# Patient Record
Sex: MALE | Race: WHITE | Employment: OTHER | URBAN - METROPOLITAN AREA
[De-identification: names, ages, dates, MRNs, and addresses within clinical notes are randomized per-mention and may not be internally consistent; named-entity substitution may affect disease eponyms.]

---

## 2017-05-30 ENCOUNTER — OFFICE VISIT (OUTPATIENT)
Dept: CARDIOLOGY CLINIC | Age: 82
End: 2017-05-30

## 2017-05-30 VITALS
BODY MASS INDEX: 38.63 KG/M2 | DIASTOLIC BLOOD PRESSURE: 70 MMHG | HEIGHT: 74 IN | SYSTOLIC BLOOD PRESSURE: 128 MMHG | HEART RATE: 84 BPM | WEIGHT: 301 LBS | OXYGEN SATURATION: 96 %

## 2017-05-30 DIAGNOSIS — I48.0 PAROXYSMAL ATRIAL FIBRILLATION (HCC): ICD-10-CM

## 2017-05-30 DIAGNOSIS — I25.5 ISCHEMIC CARDIOMYOPATHY: ICD-10-CM

## 2017-05-30 DIAGNOSIS — R00.1 SINUS BRADYCARDIA: ICD-10-CM

## 2017-05-30 DIAGNOSIS — E78.5 DYSLIPIDEMIA: ICD-10-CM

## 2017-05-30 DIAGNOSIS — I50.42 CHRONIC COMBINED SYSTOLIC AND DIASTOLIC CONGESTIVE HEART FAILURE (HCC): ICD-10-CM

## 2017-05-30 DIAGNOSIS — I50.23 SYSTOLIC CHF, ACUTE ON CHRONIC (HCC): ICD-10-CM

## 2017-05-30 DIAGNOSIS — I25.10 CORONARY ARTERY DISEASE INVOLVING NATIVE CORONARY ARTERY OF NATIVE HEART WITHOUT ANGINA PECTORIS: Primary | ICD-10-CM

## 2017-05-30 DIAGNOSIS — I10 ESSENTIAL HYPERTENSION: ICD-10-CM

## 2017-05-30 NOTE — MR AVS SNAPSHOT
Visit Information Date & Time Provider Department Dept. Phone Encounter #  
 5/30/2017  9:00 AM Fany Jasso MD [facility] [address] [id number] Upcoming Health Maintenance Date Due DTaP/Tdap/Td series (1 - Tdap) 5/7/1957 ZOSTER VACCINE AGE 60> 5/7/1996 GLAUCOMA SCREENING Q2Y 5/7/2001 Pneumococcal 65+ High/Highest Risk (1 of 2 - PCV13) 5/7/2001 MEDICARE YEARLY EXAM 5/7/2001 INFLUENZA AGE 9 TO ADULT 8/1/2017 Allergies as of 5/30/2017  Review Complete On: 10/25/2016 By: Fany Jasso MD  
  
 Severity Noted Reaction Type Reactions Colchicine    Other (comments) GI upset Morphine    Nausea and Vomiting Opium    Nausea and Vomiting Pravachol [Pravastatin]    Rash Current Immunizations  Never Reviewed No immunizations on file. Not reviewed this visit You Were Diagnosed With   
  
 Codes Comments Systolic CHF, acute on chronic (HCC)    -  Primary ICD-10-CM: Y62.45 ICD-9-CM: 428.23, 428.0 Paroxysmal atrial fibrillation (HCC)     ICD-10-CM: I48.0 ICD-9-CM: 427.31 Chronic combined systolic and diastolic congestive heart failure (HCC)     ICD-10-CM: I50.42 
ICD-9-CM: 428.42, 428.0 Ischemic cardiomyopathy     ICD-10-CM: I25.5 ICD-9-CM: 414.8 Sinus bradycardia     ICD-10-CM: R00.1 ICD-9-CM: 427.89 Coronary artery disease involving native coronary artery of native heart without angina pectoris     ICD-10-CM: I25.10 ICD-9-CM: 414.01 Essential hypertension     ICD-10-CM: I10 
ICD-9-CM: 401.9 Dyslipidemia     ICD-10-CM: E78.5 ICD-9-CM: 272.4 Vitals BP Pulse Height(growth percentile) Weight(growth percentile) SpO2 BMI  
 128/70 84 6' 2\" (1.88 m) 301 lb (136.5 kg) 96% 38.65 kg/m2 Smoking Status Never Smoker Vitals History BMI and BSA Data Body Mass Index Body Surface Area  
 38.65 kg/m 2 2.67 m 2 Preferred Pharmacy Pharmacy Name Phone [address], [phone]  [facility] [phone] Your Updated Medication List  
  
   
This list is accurate as of: 5/30/17  9:42 AM.  Always use your most recent med list.  
  
  
  
  
 aspirin 81 mg tablet Take  by mouth. AVAPRO 300 mg tablet Generic drug:  irbesartan Take 300 mg by mouth nightly. CARDURA 2 mg tablet Generic drug:  doxazosin Take 2 mg by mouth daily. CENTRUM SILVER PO Take  by mouth. Cholecalciferol (Vitamin D3) 1,000 unit Chew Commonly known as:  VITAMIN D3 Take 1,000 Units by mouth daily. furosemide 40 mg tablet Commonly known as:  LASIX Take 1 Tab by mouth two (2) times a day. GLUCOTROL XL 10 mg CR tablet Generic drug:  glipiZIDE SR Take 10 mg by mouth two (2) times a day. INVOKANA 100 mg tablet Generic drug:  canagliflozin Take 100 mg by mouth Daily (before breakfast). metoprolol succinate 100 mg tablet Commonly known as:  TOPROL-XL  
TAKE 1 TABLET BY MOUTH EVERY DAY  
  
 PLAVIX 75 mg Tab Generic drug:  clopidogrel Take  by mouth daily. TRADJENTA 5 mg tablet Generic drug:  linagliptin Take 5 mg by mouth daily. ZETIA 10 mg tablet Generic drug:  ezetimibe Take  by mouth. ZOCOR 20 mg tablet Generic drug:  simvastatin Take  by mouth nightly. We Performed the Following AMB POC EKG ROUTINE W/ 12 LEADS, INTER & REP [97940 CPT(R)] Introducing [facility]! [facility] introduces [employer] patient portal. Now you can access parts of your medical record, email your doctor's office, and request medication refills online. 1. In your internet browser, go to [email or web address] 2. Click on the First Time User? Click Here link in the Sign In box. You will see the New Member Sign Up page. 3. Enter your [employer] Access Code exactly as it appears below.  You will not need to use this code after youve completed the sign-up process. If you do not sign up before the expiration date, you must request a new code. · [employer] Access Code: Westley Hardy Expires: 8/28/2017  8:39 AM 
 
4. Enter the last four digits of your Social Security Number (xxxx) and Date of Birth (mm/dd/yyyy) as indicated and click Submit. You will be taken to the next sign-up page. 5. Create a [employer] ID. This will be your [employer] login ID and cannot be changed, so think of one that is secure and easy to remember. 6. Create a [employer] password. You can change your password at any time. 7. Enter your Password Reset Question and Answer. This can be used at a later time if you forget your password. 8. Enter your e-mail address. You will receive e-mail notification when new information is available in [address] 9. Click Sign Up. You can now view and download portions of your medical record. 10. Click the Download Summary menu link to download a portable copy of your medical information. If you have questions, please visit the Frequently Asked Questions section of the [employer] website. Remember, [employer] is NOT to be used for urgent needs. For medical emergencies, dial 911. Now available from your iPhone and Android! Please provide this summary of care documentation to your next provider. Your primary care clinician is listed as Sushma Miller. If you have any questions after today's visit, please call [phone].

## 2017-05-30 NOTE — PROGRESS NOTES
HISTORY OF PRESENT ILLNESS  Fiorella Lopez is a 81 y.o. male. ASSESSMENT and PLAN    Mr. Veronia Ahumada has history of CAD. Back in 2005, he presented with severe shortness of breath without significant chest pains. He was diagnosed with anterior wall MI and subsequently underwent LAD stent. He also has residual RCA disease. He has ischemic cardiomyopathy with ejection fraction ranging from 35-45%. He continues to struggle with obesity. Repeat coronary angiography in November of 2014 showed 40% ostial LAD lesion with otherwise no significant stenosis. His ejection fraction was 40%. His echocardiogram in October of 2015 showed ejection fraction of 35-40%.  CAD:   Clinically stable. He denies any exertional chest pains.  Ischemic cardiomyopathy: His EF remains 35-40%. As noted previously, if his ejection fraction declines below 35%, he is a candidate for AICD.  BP:   Well-controlled.  HR:    Stable.  CHF:   Currently, there is no evidence of decompensated CHF.  Weight:   His weight is 301 pounds. This is his baseline.  Cholesterol:   Target LDL <70. He continues on Zocor 20 mg and Zetia 10 mg daily.  Anti-platelet:   Remains on ASA, and Plavix. I will plan on seeing him back in about 6-9 months. Thank you. Encounter Diagnoses   Name Primary?     Coronary artery disease involving native coronary artery of native heart without angina pectoris Yes    Systolic CHF, acute on chronic (HCC)     Paroxysmal atrial fibrillation (HCC)     Chronic combined systolic and diastolic congestive heart failure (HCC)     Ischemic cardiomyopathy     Sinus bradycardia     Essential hypertension     Dyslipidemia      current treatment plan is effective, no change in therapy  lab results and schedule of future lab studies reviewed with patient  reviewed diet, exercise and weight control  cardiovascular risk and specific lipid/LDL goals reviewed  use of aspirin to prevent MI and TIA's discussed      HPI Today, Mr. Coreen Marie has no complaints of chest pains, or exertional chest pains. He denies any worsening shortness of breath or dyspnea on exertion. He does have baseline dyspnea on exertion. His exercise capacity is limited by hip pains and joint pains. He uses a cane to ambulate. He denies any orthopnea or PND. He denies any palpitations or dizziness. He is in good spirits. Review of Systems   Respiratory: Negative for shortness of breath. Cardiovascular: Negative for chest pain, palpitations, orthopnea, claudication, leg swelling and PND. All other systems reviewed and are negative. Physical Exam   Constitutional: He is oriented to person, place, and time. He appears well-developed and well-nourished. HENT:   Head: Normocephalic. Eyes: Conjunctivae are normal.   Neck: Neck supple. No JVD present. Carotid bruit is not present. No thyromegaly present. Cardiovascular: Normal rate and regular rhythm. No murmur heard. Pulmonary/Chest: Breath sounds normal.   Abdominal: Bowel sounds are normal.   Musculoskeletal: He exhibits no edema. Neurological: He is alert and oriented to person, place, and time. Skin: Skin is warm and dry. Nursing note and vitals reviewed. PCP: Roberto Fry MD    Past Medical History:   Diagnosis Date    Blood in stool     Blood in stool     CAD (coronary artery disease)     residual RCA disease at the bifurcation which is treated medically; appears stable    Cardiac catheterization 11/22/2013    RCA patent and dominant; LM ok; LAD with ostial 40%; previous stent widely patent; Cx patent; LVEDP 10-12 mmHg; LVEF 40% with mild anterior hypokinesis; No sig MR or AS    Cardiac echocardiogram 10/15/2015    Tech difficult (Definity enhanced). Mild LVE. EF 35-40% (40-45% on study of 10/16/13). Mild diffuse hypk. Mod basal-mid anteroseptal, basal-mid inferior hypk. Severe apical hypk. Mild LVH. Indeterminate diastolic fx. Mild MR. Mild IVCE.   Findings suggest CAD in LAD.  Cardiac nuclear imaging test, abnormal 10/16/2013    Lg anterior anteroapical infarction w/o cash-infarct ischemia involving distal LAD. EF not obtained due to gating error. Neg EKG on pharm stress test.  High risk due to amount of myocardium involved.  Cardiac tilt table evaluation 04/07/2005    Positive tilt-table study, suggesting vasodepressor-type syncope. Patient developed mild bradycardia after a loss of consciousness.  Diabetes ([address]     non-insulin dependent    Dyslipidemia     on statins    Hyperlipidemia     on Zocor    Hypertension     controlled; high normal    Hypokinesis     mild global w/ ejection fraction of 45-50%; acute AMI 3/05 w/ stent LAD; residual rt coronary artery disease. Post MI EF of 30-35%; Recent LV gram 12/05 showed EF of 45%    Kidney stone     Microscopic hematuria     Myocardial infarction ([address] 3/05    anterior wall; LAD stenting    Obesity     mild to moderate    Skin cancer     Urinary tract infection, site not specified        Past Surgical History:   Procedure Laterality Date    HX TONSIL AND ADENOIDECTOMY      LEFT HEART PERCUTANEOUS  11/22/2013         UT CATH PLMT L HRT & ARTS W/NJX & ANGIO IMG S&I  11/22/2013            Current Outpatient Prescriptions   Medication Sig Dispense Refill    Cholecalciferol, Vitamin D3, (VITAMIN D3) 1,000 unit chew Take 1,000 Units by mouth daily. 30 Tab 0    furosemide (LASIX) 40 mg tablet Take 1 Tab by mouth two (2) times a day. 60 Tab 0    canagliflozin (INVOKANA) 100 mg tablet Take 100 mg by mouth Daily (before breakfast).  linagliptin (TRADJENTA) 5 mg tablet Take 5 mg by mouth daily.  metoprolol-XL (TOPROL-XL) 100 mg XL tablet TAKE 1 TABLET BY MOUTH EVERY DAY 30 Tab 11    glipiZIDE SR (GLUCOTROL XL) 10 mg CR tablet Take 10 mg by mouth two (2) times a day.  aspirin 81 mg tablet Take  by mouth.  simvastatin (ZOCOR) 20 mg tablet Take  by mouth nightly.       ezetimibe (ZETIA) 10 mg tablet Take  by mouth.  irbesartan (AVAPRO) 300 mg tablet Take 300 mg by mouth nightly.  MULTIVITAMINS W-MINERALS/LUT (CENTRUM SILVER PO) Take  by mouth.  doxazosin (CARDURA) 2 mg tablet Take 2 mg by mouth daily.  clopidogrel (PLAVIX) 75 mg tablet Take  by mouth daily.          The patient has a family history of    Social History   Substance Use Topics    Smoking status: Never Smoker    Smokeless tobacco: Never Used    Alcohol use No       Lab Results   Component Value Date/Time    Cholesterol, total 98 10/16/2015 12:36 AM    HDL Cholesterol 37 10/16/2015 12:36 AM    LDL, calculated 41.8 10/16/2015 12:36 AM    Triglyceride 96 10/16/2015 12:36 AM    CHOL/HDL Ratio 2.6 10/16/2015 12:36 AM        BP Readings from Last 3 Encounters:   05/30/17 128/70   10/25/16 126/62   04/12/16 124/62        Pulse Readings from Last 3 Encounters:   05/30/17 84   10/25/16 83   04/12/16 99       Wt Readings from Last 3 Encounters:   05/30/17 136.5 kg (301 lb)   10/25/16 136.5 kg (301 lb)   04/12/16 136.1 kg (300 lb)         EKG: unchanged from previous tracings, normal sinus rhythm, nonspecific ST and T waves changes, Q waves in V1 and V2.

## 2018-03-27 ENCOUNTER — OFFICE VISIT (OUTPATIENT)
Dept: CARDIOLOGY CLINIC | Age: 83
End: 2018-03-27

## 2018-03-27 VITALS
WEIGHT: 298 LBS | HEIGHT: 74 IN | HEART RATE: 89 BPM | DIASTOLIC BLOOD PRESSURE: 62 MMHG | BODY MASS INDEX: 38.24 KG/M2 | OXYGEN SATURATION: 97 % | SYSTOLIC BLOOD PRESSURE: 133 MMHG

## 2018-03-27 DIAGNOSIS — I10 ESSENTIAL HYPERTENSION: ICD-10-CM

## 2018-03-27 DIAGNOSIS — I25.5 ISCHEMIC CARDIOMYOPATHY: ICD-10-CM

## 2018-03-27 DIAGNOSIS — I25.10 CORONARY ARTERY DISEASE INVOLVING NATIVE CORONARY ARTERY OF NATIVE HEART WITHOUT ANGINA PECTORIS: Primary | ICD-10-CM

## 2018-03-27 DIAGNOSIS — E78.5 DYSLIPIDEMIA: ICD-10-CM

## 2018-03-27 DIAGNOSIS — R00.1 SINUS BRADYCARDIA: ICD-10-CM

## 2018-03-27 PROBLEM — E66.01 SEVERE OBESITY (BMI 35.0-39.9) WITH COMORBIDITY (HCC): Status: ACTIVE | Noted: 2018-03-27

## 2018-03-27 NOTE — MR AVS SNAPSHOT
[address] [facility] [address] [address] [address] [phone] Patient: Sidney Guevara. MRN: [id number] ZULEMA:[birth year] Visit Information Date & Time Provider Department Dept. Phone Encounter #  
 3/27/2018  8:20 AM Sage Herman MD [facility] [address] [id number] Upcoming Health Maintenance Date Due DTaP/Tdap/Td series (1 - Tdap) 5/7/1956 ZOSTER VACCINE AGE 60> 3/7/1996 GLAUCOMA SCREENING Q2Y 5/7/2001 Pneumococcal 65+ High/Highest Risk (1 of 2 - PCV13) 5/7/2001 Influenza Age 5 to Adult 8/1/2017 MEDICARE YEARLY EXAM 3/14/2018 Allergies as of 3/27/2018  Review Complete On: 5/30/2017 By: Sage Herman MD  
  
 Severity Noted Reaction Type Reactions Colchicine    Other (comments) GI upset Morphine    Nausea and Vomiting Opium    Nausea and Vomiting Pravachol [Pravastatin]    Rash Current Immunizations  Never Reviewed No immunizations on file. Not reviewed this visit You Were Diagnosed With   
  
 Codes Comments Coronary artery disease involving native coronary artery of native heart without angina pectoris    -  Primary ICD-10-CM: I25.10 ICD-9-CM: 414.01 Vitals BP Pulse Height(growth percentile) Weight(growth percentile) SpO2 BMI  
 133/62 89 6' 2\" (1.88 m) 298 lb (135.2 kg) 97% 38.26 kg/m2 Smoking Status Never Smoker Vitals History BMI and BSA Data Body Mass Index Body Surface Area  
 38.26 kg/m 2 2.66 m 2 Preferred Pharmacy Pharmacy Name Phone [address], [address] [place] [phone] [facility] [phone] Your Updated Medication List  
  
   
This list is accurate as of 3/27/18  9:11 AM.  Always use your most recent med list.  
  
  
  
  
 aspirin 81 mg tablet Take  by mouth. AVAPRO 300 mg tablet Generic drug:  irbesartan Take 300 mg by mouth nightly. CARDURA 2 mg tablet Generic drug:  doxazosin Take 2 mg by mouth daily. CENTRUM SILVER PO Take  by mouth. Cholecalciferol (Vitamin D3) 1,000 unit Chew Commonly known as:  VITAMIN D3 Take 1,000 Units by mouth daily. furosemide 40 mg tablet Commonly known as:  LASIX Take 1 Tab by mouth two (2) times a day. GLUCOTROL XL 10 mg CR tablet Generic drug:  glipiZIDE SR Take 10 mg by mouth two (2) times a day. INVOKANA 100 mg tablet Generic drug:  canagliflozin Take 100 mg by mouth Daily (before breakfast). metoprolol succinate 100 mg tablet Commonly known as:  TOPROL-XL  
TAKE 1 TABLET BY MOUTH EVERY DAY  
  
 PLAVIX 75 mg Tab Generic drug:  clopidogrel Take  by mouth daily. TRADJENTA 5 mg tablet Generic drug:  linagliptin Take 5 mg by mouth daily. ZETIA 10 mg tablet Generic drug:  ezetimibe Take  by mouth. ZOCOR 20 mg tablet Generic drug:  simvastatin Take  by mouth nightly. We Performed the Following AMB POC EKG ROUTINE W/ 12 LEADS, INTER & REP [42781 CPT(R)] Introducing [facility]! Teresa Bryson introduces [employer] patient portal. Now you can access parts of your medical record, email your doctor's office, and request medication refills online. 1. In your internet browser, go to [email or web address] 2. Click on the First Time User? Click Here link in the Sign In box. You will see the New Member Sign Up page. 3. Enter your [employer] Access Code exactly as it appears below. You will not need to use this code after youve completed the sign-up process. If you do not sign up before the expiration date, you must request a new code. · [employer] Access Code: [email or web address] Expires: 6/25/2018  8:02 AM 
 
4. Enter the last four digits of your Social Security Number (xxxx) and Date of Birth (mm/dd/yyyy) as indicated and click Submit.  You will be taken to the next sign-up page. 5. Create a [employer] ID. This will be your [employer] login ID and cannot be changed, so think of one that is secure and easy to remember. 6. Create a [employer] password. You can change your password at any time. 7. Enter your Password Reset Question and Answer. This can be used at a later time if you forget your password. 8. Enter your e-mail address. You will receive e-mail notification when new information is available in [address] 9. Click Sign Up. You can now view and download portions of your medical record. 10. Click the Download Summary menu link to download a portable copy of your medical information. If you have questions, please visit the Frequently Asked Questions section of the [employer] website. Remember, [employer] is NOT to be used for urgent needs. For medical emergencies, dial 911. Now available from your iPhone and Android! Please provide this summary of care documentation to your next provider. Your primary care clinician is listed as Jennie Jacobs. If you have any questions after today's visit, please call [phone].

## 2018-03-27 NOTE — PROGRESS NOTES
HISTORY OF PRESENT ILLNESS  Dede Summers. is a 81 y.o. male. ASSESSMENT and PLAN    Mr. Dmitry Monsalve has history of CAD. Back in 2005, he presented with severe shortness of breath without significant chest pains. He was diagnosed with anterior wall MI and subsequently underwent LAD stent. He also has residual RCA disease. He has ischemic cardiomyopathy with ejection fraction ranging from 35-45%. He continues to struggle with obesity. Repeat coronary angiography in November of 2014 showed 40% ostial LAD lesion with otherwise no significant stenosis. His ejection fraction was 40%. His echocardiogram in October of 2015 showed ejection fraction of 35-40%.  CAD:   Clinically stable.  BP:   Well-controlled.  HR:    Stable.  CHF:   There is no evidence of decompensated CHF noted. Does have history of ischemic cardiomyopathy with ejection fraction of 35-47. His last echocardiogram was in 2015.  Weight:   His weight today is 298 pounds. His baseline weight is 300 pounds. Again, further encouragement was given for weight control.  Cholesterol:   Target LDL <70. Remains on Zocor 20 and Zetia 10.  Anti-platelet:   Remains on ASA, and Plavix. I will see him back in about 6-9 months. Thank you. Encounter Diagnoses   Name Primary?     Coronary artery disease involving native coronary artery of native heart without angina pectoris Yes    Class 2 obesity due to excess calories with serious comorbidity and body mass index (BMI) of 38.0 to 38.9 in adult     Sinus bradycardia     Ischemic cardiomyopathy     Dyslipidemia     Essential hypertension      current treatment plan is effective, no change in therapy  lab results and schedule of future lab studies reviewed with patient  reviewed diet, exercise and weight control  cardiovascular risk and specific lipid/LDL goals reviewed  use of aspirin to prevent MI and TIA's discussed      HPI  Today, Mr. Reginold Sever has no complaints of chest pains, increased shortness of breath or decreased exercise capacity. 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 He denies any changes in his exercise stamina. He denies any orthopnea or PND. He denies any palpitations or dizziness. Review of Systems   Respiratory: Negative for shortness of breath. Cardiovascular: Negative for chest pain, palpitations, orthopnea, claudication, leg swelling and PND. All other systems reviewed and are negative. Physical Exam   Constitutional: He is oriented to person, place, and time. He appears well-developed and well-nourished. HENT:   Head: Normocephalic. Eyes: Conjunctivae are normal.   Neck: Neck supple. No JVD present. Carotid bruit is not present. No thyromegaly present. Cardiovascular: Normal rate and regular rhythm. No murmur heard. Pulmonary/Chest: Breath sounds normal.   Abdominal: Bowel sounds are normal.   Musculoskeletal: He exhibits no edema. Neurological: He is alert and oriented to person, place, and time. Skin: Skin is warm and dry. Nursing note and vitals reviewed. PCP: Hoda Agosto MD    Past Medical History:   Diagnosis Date    Blood in stool     Blood in stool     CAD (coronary artery disease)     residual RCA disease at the bifurcation which is treated medically; appears stable    Cardiac catheterization 11/22/2013    RCA patent and dominant; LM ok; LAD with ostial 40%; previous stent widely patent; Cx patent; LVEDP 10-12 mmHg; LVEF 40% with mild anterior hypokinesis; No sig MR or AS    Cardiac echocardiogram 10/15/2015    Tech difficult (Definity enhanced). Mild LVE. EF 35-40% (40-45% on study of 10/16/13). Mild diffuse hypk. Mod basal-mid anteroseptal, basal-mid inferior hypk. Severe apical hypk. Mild LVH. Indeterminate diastolic fx. Mild MR. Mild IVCE. Findings suggest CAD in LAD.     Cardiac nuclear imaging test, abnormal 10/16/2013    Lg anterior anteroapical infarction w/o cash-infarct ischemia involving distal LAD. EF not obtained due to gating error. Neg EKG on pharm stress test.  High risk due to amount of myocardium involved.  Cardiac tilt table evaluation 04/07/2005    Positive tilt-table study, suggesting vasodepressor-type syncope. Patient developed mild bradycardia after a loss of consciousness.  Diabetes ([address]     non-insulin dependent    Dyslipidemia     on statins    Hyperlipidemia     on Zocor    Hypertension     controlled; high normal    Hypokinesis     mild global w/ ejection fraction of 45-50%; acute AMI 3/05 w/ stent LAD; residual rt coronary artery disease. Post MI EF of 30-35%; Recent LV gram 12/05 showed EF of 45%    Kidney stone     Microscopic hematuria     Myocardial infarction 3/05    anterior wall; LAD stenting    Obesity     mild to moderate    Skin cancer     Urinary tract infection, site not specified        Past Surgical History:   Procedure Laterality Date    HX TONSIL AND ADENOIDECTOMY      LEFT HEART PERCUTANEOUS  11/22/2013         DE CATH PLMT L HRT & ARTS W/NJX & ANGIO IMG S&I  11/22/2013            Current Outpatient Prescriptions   Medication Sig Dispense Refill    Cholecalciferol, Vitamin D3, (VITAMIN D3) 1,000 unit chew Take 1,000 Units by mouth daily. 30 Tab 0    furosemide (LASIX) 40 mg tablet Take 1 Tab by mouth two (2) times a day. 60 Tab 0    canagliflozin (INVOKANA) 100 mg tablet Take 100 mg by mouth Daily (before breakfast).  linagliptin (TRADJENTA) 5 mg tablet Take 5 mg by mouth daily.  metoprolol-XL (TOPROL-XL) 100 mg XL tablet TAKE 1 TABLET BY MOUTH EVERY DAY 30 Tab 11    glipiZIDE SR (GLUCOTROL XL) 10 mg CR tablet Take 10 mg by mouth two (2) times a day.  aspirin 81 mg tablet Take  by mouth.  simvastatin (ZOCOR) 20 mg tablet Take  by mouth nightly.  ezetimibe (ZETIA) 10 mg tablet Take  by mouth.  irbesartan (AVAPRO) 300 mg tablet Take 300 mg by mouth nightly.       MULTIVITAMINS W-MINERALS/LUT (CENTRUM SILVER PO) Take  by mouth.  doxazosin (CARDURA) 2 mg tablet Take 2 mg by mouth daily.  clopidogrel (PLAVIX) 75 mg tablet Take  by mouth daily.          The patient has a family history of    Social History   Substance Use Topics    Smoking status: Never Smoker    Smokeless tobacco: Never Used    Alcohol use No       Lab Results   Component Value Date/Time    Cholesterol, total 98 10/16/2015 12:36 AM    HDL Cholesterol 37 (L) 10/16/2015 12:36 AM    LDL, calculated 41.8 10/16/2015 12:36 AM    Triglyceride 96 10/16/2015 12:36 AM    CHOL/HDL Ratio 2.6 10/16/2015 12:36 AM        BP Readings from Last 3 Encounters:   03/27/18 133/62   05/30/17 128/70   10/25/16 126/62        Pulse Readings from Last 3 Encounters:   03/27/18 89   05/30/17 84   10/25/16 83       Wt Readings from Last 3 Encounters:   03/27/18 135.2 kg (298 lb)   05/30/17 136.5 kg (301 lb)   10/25/16 136.5 kg (301 lb)         EKG: unchanged from previous tracings, normal sinus rhythm, Q waves in V1 and V2.

## 2018-03-27 NOTE — PROGRESS NOTES
1. Have you been to the ER, urgent care clinic since your last visit? Hospitalized since your last visit?no    2. Have you seen or consulted any other health care providers outside of the [address] [place] [address] since your last visit? Include any pap smears or colon screening.  no

## 2018-10-09 ENCOUNTER — OFFICE VISIT (OUTPATIENT)
Dept: CARDIOLOGY CLINIC | Age: 83
End: 2018-10-09

## 2018-10-09 VITALS
BODY MASS INDEX: 38.5 KG/M2 | SYSTOLIC BLOOD PRESSURE: 114 MMHG | HEART RATE: 110 BPM | OXYGEN SATURATION: 97 % | WEIGHT: 300 LBS | HEIGHT: 74 IN | DIASTOLIC BLOOD PRESSURE: 56 MMHG

## 2018-10-09 DIAGNOSIS — I48.0 PAROXYSMAL ATRIAL FIBRILLATION (HCC): ICD-10-CM

## 2018-10-09 DIAGNOSIS — I25.5 ISCHEMIC CARDIOMYOPATHY: ICD-10-CM

## 2018-10-09 DIAGNOSIS — I50.23 SYSTOLIC CHF, ACUTE ON CHRONIC (HCC): ICD-10-CM

## 2018-10-09 DIAGNOSIS — I25.10 CORONARY ARTERY DISEASE INVOLVING NATIVE CORONARY ARTERY OF NATIVE HEART WITHOUT ANGINA PECTORIS: Primary | ICD-10-CM

## 2018-10-09 NOTE — PROGRESS NOTES
HISTORY OF PRESENT ILLNESS Stacy Greenwood is a 81 y.o. male. ASSESSMENT and PLAN Mr. Aspen Goncalves has history of CAD. Back in 2005, he presented with severe shortness of breath without significant chest pains. He was diagnosed with anterior wall MI and subsequently underwent LAD stent. He also has residual RCA disease. He has ischemic cardiomyopathy with ejection fraction ranging from 35-45%. He continues to struggle with obesity. Repeat coronary angiography in November of 2014 showed 40% ostial LAD lesion with otherwise no significant stenosis. His ejection fraction was 40%. His echocardiogram in October of 2015 showed ejection fraction of 35-40%. ? CAD:   Clinically stable. 
? BP:   Well-controlled. ? HR:    He is tachycardic today. I discussed with him at length about possibly changing his Toprol-XL dose versus changing to carvedilol with his diminished LV function of 35-40%. After the lengthy discussion, decision was made to continue his current regimen. When I rechecked his heart rate at the end of the visit, his heart rate was down to 90 bpm.  This is likely from the fact that he had time to relax after his recent ambulation. ? CHF:   Currently, there is no evidence of decompensated CHF noted. ? Weight:   His weight today is 300 pounds. It is at baseline. ? Cholesterol:   Target LDL <70. Zocor 20, Zetia 10. 
? Anti-platelet:   Remains on ASA, and Plavix. I will see him back in about 6-9 months. Thank you. Encounter Diagnoses Name Primary?  Coronary artery disease involving native coronary artery of native heart without angina pectoris Yes  Systolic CHF, acute on chronic (HCC)  Paroxysmal atrial fibrillation (HCC)  Ischemic cardiomyopathy   
 
current treatment plan is effective, no change in therapy 
lab results and schedule of future lab studies reviewed with patient 
reviewed diet, exercise and weight control cardiovascular risk and specific lipid/LDL goals reviewed 
use of aspirin to prevent MI and TIA's discussed HPI Today, Mr. Wendy Pratt has no complaints of chest pains, increased shortness of breath or decreased exercise capacity. His exercise capacity is limited by his hip pains and knee pains. He uses a cane. He has not noted significant changes. He denies any orthopnea or PND. He denies any palpitations or dizziness. Unfortunately, he has not been able lose significant weight. Review of Systems Respiratory: Negative for shortness of breath. Cardiovascular: Negative for chest pain, palpitations, orthopnea, claudication, leg swelling and PND. Musculoskeletal: Positive for back pain and joint pain. All other systems reviewed and are negative. Physical Exam  
Constitutional: He is oriented to person, place, and time. He appears well-developed and well-nourished. HENT:  
Head: Normocephalic. Eyes: Conjunctivae are normal.  
Neck: Neck supple. No JVD present. Carotid bruit is not present. No thyromegaly present. Cardiovascular: Normal rate and regular rhythm. Pulmonary/Chest: Breath sounds normal.  
Abdominal: Bowel sounds are normal.  
Musculoskeletal: He exhibits no edema. Neurological: He is alert and oriented to person, place, and time. Skin: Skin is warm and dry. Nursing note and vitals reviewed. PCP: Thedford Denver, MD 
 
Past Medical History:  
Diagnosis Date  Blood in stool  Blood in stool  CAD (coronary artery disease)   
 residual RCA disease at the bifurcation which is treated medically; appears stable  Cardiac catheterization 11/22/2013 RCA patent and dominant; LM ok; LAD with ostial 40%; previous stent widely patent; Cx patent; LVEDP 10-12 mmHg; LVEF 40% with mild anterior hypokinesis; No sig MR or AS  Cardiac echocardiogram 10/15/2015 Tech difficult (Definity enhanced). Mild LVE.   EF 35-40% (40-45% on study of 10/16/13). Mild diffuse hypk. Mod basal-mid anteroseptal, basal-mid inferior hypk. Severe apical hypk. Mild LVH. Indeterminate diastolic fx. Mild MR. Mild IVCE. Findings suggest CAD in LAD.  Cardiac nuclear imaging test, abnormal 10/16/2013 Lg anterior anteroapical infarction w/o cash-infarct ischemia involving distal LAD. EF not obtained due to gating error. Neg EKG on pharm stress test.  High risk due to amount of myocardium involved.  Cardiac tilt table evaluation 04/07/2005 Positive tilt-table study, suggesting vasodepressor-type syncope. Patient developed mild bradycardia after a loss of consciousness.  Diabetes ([address]   
 non-insulin dependent  Dyslipidemia   
 on statins  Hyperlipidemia   
 on Zocor  Hypertension   
 controlled; high normal  
 Hypokinesis   
 mild global w/ ejection fraction of 45-50%; acute AMI 3/05 w/ stent LAD; residual rt coronary artery disease. Post MI EF of 30-35%; Recent LV gram 12/05 showed EF of 45%  Kidney stone  Microscopic hematuria  Myocardial infarction ([address] 3/05  
 anterior wall; LAD stenting  Obesity   
 mild to moderate  Skin cancer  Urinary tract infection, site not specified Past Surgical History:  
Procedure Laterality Date  HX TONSIL AND ADENOIDECTOMY  LEFT HEART PERCUTANEOUS  11/22/2013  [place] CATH PLMT L HRT & ARTS W/NJX & ANGIO IMG S&I  11/22/2013 Current Outpatient Prescriptions Medication Sig Dispense Refill  Cholecalciferol, Vitamin D3, (VITAMIN D3) 1,000 unit chew Take 1,000 Units by mouth daily. 30 Tab 0  
 furosemide (LASIX) 40 mg tablet Take 1 Tab by mouth two (2) times a day. 60 Tab 0  
 canagliflozin (INVOKANA) 100 mg tablet Take 100 mg by mouth Daily (before breakfast).  linagliptin (TRADJENTA) 5 mg tablet Take 5 mg by mouth daily.     
 metoprolol-XL (TOPROL-XL) 100 mg XL tablet TAKE 1 TABLET BY MOUTH EVERY DAY 30 Tab 11  
  glipiZIDE SR (GLUCOTROL XL) 10 mg CR tablet Take 10 mg by mouth two (2) times a day.  aspirin 81 mg tablet Take  by mouth.  simvastatin (ZOCOR) 20 mg tablet Take  by mouth nightly.  ezetimibe (ZETIA) 10 mg tablet Take  by mouth.  irbesartan (AVAPRO) 300 mg tablet Take 300 mg by mouth nightly.  MULTIVITAMINS W-MINERALS/LUT (CENTRUM SILVER PO) Take  by mouth.  doxazosin (CARDURA) 2 mg tablet Take 2 mg by mouth daily.  clopidogrel (PLAVIX) 75 mg tablet Take  by mouth daily. The patient has a family history of 
 
Social History Substance Use Topics  Smoking status: Never Smoker  Smokeless tobacco: Never Used  Alcohol use No  
 
 
Lab Results Component Value Date/Time Cholesterol, total 98 10/16/2015 12:36 AM  
 HDL Cholesterol 37 (L) 10/16/2015 12:36 AM  
 LDL, calculated 41.8 10/16/2015 12:36 AM  
 Triglyceride 96 10/16/2015 12:36 AM  
 CHOL/HDL Ratio 2.6 10/16/2015 12:36 AM  
  
 
BP Readings from Last 3 Encounters:  
10/09/18 114/56  
03/27/18 133/62  
05/30/17 128/70 Pulse Readings from Last 3 Encounters:  
10/09/18 (!) 110  
03/27/18 89  
05/30/17 84 Wt Readings from Last 3 Encounters:  
10/09/18 136.1 kg (300 lb)  
03/27/18 135.2 kg (298 lb) 05/30/17 136.5 kg (301 lb) EKG: nonspecific ST and T waves changes, sinus tachycardia, 1st degree AV block, decreased anterior voltage with late transition.

## 2018-10-09 NOTE — MR AVS SNAPSHOT
[address] [place] [id number] [phone] Patient: Zulma Graff MRN: [id number] Visit Information Date & Time Provider Department Dept. Phone Encounter #  
 10/9/2018  8:20 AM Julian Hurley MD [facility] [id number] Upcoming Health Maintenance Date Due DTaP/Tdap/Td series (1 - Tdap) 5/7/1956 Shingrix Vaccine Age 50> (1 of 2) 5/7/1986 GLAUCOMA SCREENING Q2Y 5/7/2001 Pneumococcal 65+ High/Highest Risk (1 of 2 - PCV13) 5/7/2001 MEDICARE YEARLY EXAM 3/14/2018 Influenza Age 5 to Adult 8/1/2018 Allergies as of 10/9/2018  Review Complete On: 3/27/2018 By: Julian Hurley MD  
  
 Severity Noted Reaction Type Reactions Colchicine    Other (comments) GI upset Morphine    Nausea and Vomiting Opium    Nausea and Vomiting Pravachol [Pravastatin]    Rash Current Immunizations  Never Reviewed No immunizations on file. Not reviewed this visit You Were Diagnosed With   
  
 Codes Comments Systolic CHF, acute on chronic (HCC)    -  Primary ICD-10-CM: N23.34 ICD-9-CM: 428.23, 428.0 Paroxysmal atrial fibrillation (HCC)     ICD-10-CM: I48.0 ICD-9-CM: 427.31 Ischemic cardiomyopathy     ICD-10-CM: I25.5 ICD-9-CM: 414.8 Coronary artery disease involving native coronary artery of native heart without angina pectoris     ICD-10-CM: I25.10 ICD-9-CM: 414.01 Vitals BP Pulse Height(growth percentile) Weight(growth percentile) SpO2 BMI  
 114/56 (!) 110 6' 2\" (1.88 m) 300 lb (136.1 kg) 97% 38.52 kg/m2 Smoking Status Never Smoker Vitals History BMI and BSA Data Body Mass Index Body Surface Area 38.52 kg/m 2 2.67 m 2 Preferred Pharmacy Pharmacy Name Phone [address], [address]ylie Mckeon [address] [facility] [address] [facility] [phone] Your Updated Medication List  
  
   
This list is accurate as of 10/9/18  9:04 AM.  Always use your most recent med list.  
  
  
  
  
 aspirin 81 mg tablet Take  by mouth. AVAPRO 300 mg tablet Generic drug:  irbesartan Take 300 mg by mouth nightly. CARDURA 2 mg tablet Generic drug:  doxazosin Take 2 mg by mouth daily. CENTRUM SILVER PO Take  by mouth. Cholecalciferol (Vitamin D3) 1,000 unit Chew Commonly known as:  VITAMIN D3 Take 1,000 Units by mouth daily. furosemide 40 mg tablet Commonly known as:  LASIX Take 1 Tab by mouth two (2) times a day. GLUCOTROL XL 10 mg CR tablet Generic drug:  glipiZIDE SR Take 10 mg by mouth two (2) times a day. INVOKANA 100 mg tablet Generic drug:  canagliflozin Take 100 mg by mouth Daily (before breakfast). metoprolol succinate 100 mg tablet Commonly known as:  TOPROL-XL  
TAKE 1 TABLET BY MOUTH EVERY DAY  
  
 PLAVIX 75 mg Tab Generic drug:  clopidogrel Take  by mouth daily. TRADJENTA 5 mg tablet Generic drug:  linagliptin Take 5 mg by mouth daily. ZETIA 10 mg tablet Generic drug:  ezetimibe Take  by mouth. ZOCOR 20 mg tablet Generic drug:  simvastatin Take  by mouth nightly. We Performed the Following AMB POC EKG ROUTINE W/ 12 LEADS, INTER & REP [60603 CPT(R)] Introducing [facility] SERVICES! Roderick Drake introduces [employer] patient portal. Now you can access parts of your medical record, email your doctor's office, and request medication refills online. 1. In your internet browser, go to [email or web address] 2. Click on the First Time User? Click Here link in the Sign In box. You will see the New Member Sign Up page. 3. Enter your [employer] Access Code exactly as it appears below. You will not need to use this code after youve completed the sign-up process.  If you do not sign up before the expiration date, you must request a new code. · [employer] Access Code: [email or web address] Expires: 1/7/2019  8:01 AM 
 
4. Enter the last four digits of your Social Security Number (xxxx) and Date of Birth (mm/dd/yyyy) as indicated and click Submit. You will be taken to the next sign-up page. 5. Create a [employer] ID. This will be your [employer] login ID and cannot be changed, so think of one that is secure and easy to remember. 6. Create a [employer] password. You can change your password at any time. 7. Enter your Password Reset Question and Answer. This can be used at a later time if you forget your password. 8. Enter your e-mail address. You will receive e-mail notification when new information is available in [address] 9. Click Sign Up. You can now view and download portions of your medical record. 10. Click the Download Summary menu link to download a portable copy of your medical information. If you have questions, please visit the Frequently Asked Questions section of the [employer] website. Remember, [employer] is NOT to be used for urgent needs. For medical emergencies, dial 911. Now available from your iPhone and Android! Please provide this summary of care documentation to your next provider. Your primary care clinician is listed as [facility]. If you have any questions after today's visit, please call [phone].

## 2018-10-09 NOTE — PROGRESS NOTES
Ana Hooper. presents today for Chief Complaint Patient presents with  Coronary Artery Disease 6 month follow up - no cardiac complaints Jeanne Chacon. preferred language for health care discussion is english/other. Is someone accompanying this pt? Self Is the patient using any DME equipment during OV? Chiquita Bunn Depression Screening: No flowsheet data found. Learning Assessment: 
Learning Assessment 5/30/2017 PRIMARY LEARNER Patient CO-LEARNER CAREGIVER -  
PRIMARY LANGUAGE ENGLISH  
LEARNER PREFERENCE PRIMARY DEMONSTRATION  
ANSWERED BY Patient RELATIONSHIP SELF Abuse Screening: No flowsheet data found. Fall Risk No flowsheet data found. Pt currently taking Anticoagulant therapy? ASA 81mg daily and Plavix Coordination of Care: 1. Have you been to the ER, urgent care clinic since your last visit? Hospitalized since your last visit? No  
 
2. Have you seen or consulted any other health care providers outside of the [address] [place] [address] since your last visit? Include any pap smears or colon screening.  No

## 2019-04-23 ENCOUNTER — OFFICE VISIT (OUTPATIENT)
Dept: CARDIOLOGY CLINIC | Age: 84
End: 2019-04-23

## 2019-04-23 VITALS
HEIGHT: 74 IN | OXYGEN SATURATION: 93 % | HEART RATE: 100 BPM | BODY MASS INDEX: 38.12 KG/M2 | SYSTOLIC BLOOD PRESSURE: 104 MMHG | WEIGHT: 297 LBS | DIASTOLIC BLOOD PRESSURE: 60 MMHG

## 2019-04-23 DIAGNOSIS — I25.10 CORONARY ARTERY DISEASE INVOLVING NATIVE CORONARY ARTERY OF NATIVE HEART WITHOUT ANGINA PECTORIS: Primary | ICD-10-CM

## 2019-04-23 NOTE — PROGRESS NOTES
HISTORY OF PRESENT ILLNESS Lorie Mendez is a 81 y.o. male. ASSESSMENT and PLAN Mr. Lu Mcduffie has history of CAD. Back in 2005, he presented with severe shortness of breath without significant chest pains. He was diagnosed with anterior wall MI and subsequently underwent LAD stent. He also has residual RCA disease. He has ischemic cardiomyopathy with ejection fraction ranging from 35-45%. He continues to struggle with obesity. Repeat coronary angiography in November of 2014 showed 40% ostial LAD lesion with otherwise no significant stenosis. His ejection fraction was 40%. His echocardiogram in October of 2015 showed ejection fraction of 35-40%. He has known first-degree heart block. · CAD:    Symptomatically stable. · BP:   Well controlled. · HR:    Remains borderline tachycardic. · CHF:    Currently, there is no evidence of decompensated CHF noted. · Weight:    His weight today is 297 pounds. He has lost 3 pounds since his last visit. · Cholesterol:   Target LDL <70. Zocor 20, Zetia 10. · Anti-platelet:   Remains on ASA, and Plavix. I will see him back in about 6-9 months. Thank you. Encounter Diagnoses Name Primary?  Coronary artery disease involving native coronary artery of native heart without angina pectoris Yes  
 
current treatment plan is effective, no change in therapy 
lab results and schedule of future lab studies reviewed with patient 
reviewed diet, exercise and weight control 
cardiovascular risk and specific lipid/LDL goals reviewed 
use of aspirin to prevent MI and TIA's discussed HPI Today, Mr. Fabio Be has no complaints of chest pains, increased shortness of breath or decreased exercise capacity. He is in good spirits. He remains active physically; his activity levels are limited by hip and knee pains. He does use a cane. He denies any orthopnea or PND. He denies any palpitations, dizziness or stone syncope. Review of Systems Respiratory: Negative for shortness of breath. Cardiovascular: Negative for chest pain, palpitations, orthopnea, claudication, leg swelling and PND. Musculoskeletal: Positive for back pain and joint pain. All other systems reviewed and are negative. Physical Exam  
Constitutional: He is oriented to person, place, and time. He appears well-developed and well-nourished. HENT:  
Head: Normocephalic. Eyes: Conjunctivae are normal.  
Neck: Neck supple. No JVD present. Carotid bruit is not present. No thyromegaly present. Cardiovascular: Normal rate and regular rhythm. Pulmonary/Chest: Breath sounds normal.  
Abdominal: Bowel sounds are normal.  
Musculoskeletal: He exhibits no edema. Neurological: He is alert and oriented to person, place, and time. Skin: Skin is warm and dry. Nursing note and vitals reviewed. PCP: Dino Villar MD 
 
Past Medical History:  
Diagnosis Date  Blood in stool  Blood in stool  CAD (coronary artery disease)   
 residual RCA disease at the bifurcation which is treated medically; appears stable  Cardiac catheterization 11/22/2013 RCA patent and dominant; LM ok; LAD with ostial 40%; previous stent widely patent; Cx patent; LVEDP 10-12 mmHg; LVEF 40% with mild anterior hypokinesis; No sig MR or AS  Cardiac echocardiogram 10/15/2015 Tech difficult (Definity enhanced). Mild LVE. EF 35-40% (40-45% on study of 10/16/13). Mild diffuse hypk. Mod basal-mid anteroseptal, basal-mid inferior hypk. Severe apical hypk. Mild LVH. Indeterminate diastolic fx. Mild MR. Mild IVCE. Findings suggest CAD in LAD.  Cardiac nuclear imaging test, abnormal 10/16/2013 Lg anterior anteroapical infarction w/o cash-infarct ischemia involving distal LAD. EF not obtained due to gating error. Neg EKG on pharm stress test.  High risk due to amount of myocardium involved.  Cardiac tilt table evaluation 04/07/2005 Positive tilt-table study, suggesting vasodepressor-type syncope. Patient developed mild bradycardia after a loss of consciousness.  Diabetes ([address]   
 non-insulin dependent  Dyslipidemia   
 on statins  Hyperlipidemia   
 on Zocor  Hypertension   
 controlled; high normal  
 Hypokinesis   
 mild global w/ ejection fraction of 45-50%; acute AMI 3/05 w/ stent LAD; residual rt coronary artery disease. Post MI EF of 30-35%; Recent LV gram 12/05 showed EF of 45%  Kidney stone  Microscopic hematuria  Myocardial infarction [address] 3/05  
 anterior wall; LAD stenting  Obesity   
 mild to moderate  Skin cancer  Urinary tract infection, site not specified Past Surgical History:  
Procedure Laterality Date  HX TONSIL AND ADENOIDECTOMY  LEFT HEART PERCUTANEOUS  11/22/2013  [place] CATH PLMT L HRT & ARTS W/NJX & ANGIO IMG S&I  11/22/2013 Current Outpatient Medications Medication Sig Dispense Refill  Cholecalciferol, Vitamin D3, (VITAMIN D3) 1,000 unit chew Take 1,000 Units by mouth daily. 30 Tab 0  
 furosemide (LASIX) 40 mg tablet Take 1 Tab by mouth two (2) times a day. 60 Tab 0  
 canagliflozin (INVOKANA) 100 mg tablet Take 100 mg by mouth Daily (before breakfast).  linagliptin (TRADJENTA) 5 mg tablet Take 5 mg by mouth daily.  metoprolol-XL (TOPROL-XL) 100 mg XL tablet TAKE 1 TABLET BY MOUTH EVERY DAY 30 Tab 11  
 glipiZIDE SR (GLUCOTROL XL) 10 mg CR tablet Take 10 mg by mouth two (2) times a day.  aspirin 81 mg tablet Take  by mouth.  simvastatin (ZOCOR) 20 mg tablet Take  by mouth nightly.  ezetimibe (ZETIA) 10 mg tablet Take  by mouth.  irbesartan (AVAPRO) 300 mg tablet Take 300 mg by mouth nightly.  MULTIVITAMINS W-MINERALS/LUT (CENTRUM SILVER PO) Take  by mouth.  doxazosin (CARDURA) 2 mg tablet Take 2 mg by mouth daily.  clopidogrel (PLAVIX) 75 mg tablet Take  by mouth daily. The patient has a family history of 
 
Social History Tobacco Use  Smoking status: Never Smoker  Smokeless tobacco: Never Used Substance Use Topics  Alcohol use: No  
 Drug use: Not on file Lab Results Component Value Date/Time Cholesterol, total 98 10/16/2015 12:36 AM  
 HDL Cholesterol 37 (L) 10/16/2015 12:36 AM  
 LDL, calculated 41.8 10/16/2015 12:36 AM  
 Triglyceride 96 10/16/2015 12:36 AM  
 CHOL/HDL Ratio 2.6 10/16/2015 12:36 AM  
  
 
BP Readings from Last 3 Encounters:  
04/23/19 104/60  
10/09/18 114/56  
03/27/18 133/62 Pulse Readings from Last 3 Encounters:  
04/23/19 100  
10/09/18 (!) 110  
03/27/18 89 Wt Readings from Last 3 Encounters:  
04/23/19 134.7 kg (297 lb) 10/09/18 136.1 kg (300 lb)  
03/27/18 135.2 kg (298 lb) EKG: unchanged from previous tracings, normal sinus rhythm, occasional PVC noted, unifocal, 1st degree AV block, decreased precordial voltage.

## 2019-10-29 ENCOUNTER — OFFICE VISIT (OUTPATIENT)
Dept: CARDIOLOGY CLINIC | Age: 84
End: 2019-10-29

## 2019-10-29 VITALS
DIASTOLIC BLOOD PRESSURE: 62 MMHG | SYSTOLIC BLOOD PRESSURE: 128 MMHG | HEIGHT: 74 IN | OXYGEN SATURATION: 98 % | WEIGHT: 304 LBS | HEART RATE: 89 BPM | BODY MASS INDEX: 39.01 KG/M2

## 2019-10-29 DIAGNOSIS — I25.5 ISCHEMIC CARDIOMYOPATHY: Primary | ICD-10-CM

## 2019-10-29 DIAGNOSIS — I25.10 CORONARY ARTERY DISEASE INVOLVING NATIVE CORONARY ARTERY OF NATIVE HEART WITHOUT ANGINA PECTORIS: ICD-10-CM

## 2019-10-29 DIAGNOSIS — R00.1 SINUS BRADYCARDIA: ICD-10-CM

## 2019-10-29 DIAGNOSIS — I48.0 PAROXYSMAL ATRIAL FIBRILLATION (HCC): ICD-10-CM

## 2019-10-29 DIAGNOSIS — I50.23 SYSTOLIC CHF, ACUTE ON CHRONIC (HCC): ICD-10-CM

## 2019-10-29 NOTE — PROGRESS NOTES
HISTORY OF PRESENT ILLNESS  Juan Bolanos. is a 81 y.o. male. ASSESSMENT and PLAN    Mr. Bubba Peng has history of CAD. Back in 2005, he presented with severe shortness of breath without significant chest pains. He was diagnosed with anterior wall MI and subsequently underwent LAD stent. He also has residual RCA disease. He has ischemic cardiomyopathy with ejection fraction ranging from 35-45%. He continues to struggle with obesity. Repeat coronary angiography in November of 2014 showed 40% ostial LAD lesion with otherwise no significant stenosis. His ejection fraction was 40%. His echocardiogram in October of 2015 showed ejection fraction of 35-40%. He has known first-degree heart block. · CAD:    Clinically stable. · BP:   Well controlled. · HR:    Stable. · CHF:    There is no evidence of decompensated CHF noted. · Weight:    His weight today is up to 304 pounds. He has gained about 7 pounds since his last visit. Again, strong encouragement was given for weight control. I advised him not to gain any further weight and attempt to lose a recent weight that he had gained. · Cholesterol:   Target LDL <70. Zocor 20, Zetia 10. · Anti-platelet:   Remains on ASA, and Plavix.      I will see him back in about 6-9 months. Thank you. Encounter Diagnoses   Name Primary?     Ischemic cardiomyopathy Yes    Coronary artery disease involving native coronary artery of native heart without angina pectoris     Systolic CHF, acute on chronic (HCC)     Paroxysmal atrial fibrillation (HCC)     Sinus bradycardia      current treatment plan is effective, no change in therapy  lab results and schedule of future lab studies reviewed with patient  reviewed diet, exercise and weight control  cardiovascular risk and specific lipid/LDL goals reviewed  use of aspirin to prevent MI and TIA's discussed        HPI   Today, Mr. Bear Skaggs has no complaints of chest pains, increased shortness of breath or decreased exercise capacity. He is unable to ambulate easily because of severe bilateral knee pains, right greater than left. He does use a cane for ambulation. He had consider having knee replacement performed in the past but has always change his mind. He denies any orthopnea or PND. He denies any palpitations or dizziness. Review of Systems   Respiratory: Positive for shortness of breath. Cardiovascular: Negative for chest pain, palpitations, orthopnea, claudication, leg swelling and PND. Musculoskeletal: Positive for joint pain. All other systems reviewed and are negative. Physical Exam   Constitutional: He is oriented to person, place, and time. He appears well-developed and well-nourished. HENT:   Head: Normocephalic. Eyes: Conjunctivae are normal.   Neck: Neck supple. No JVD present. Carotid bruit is not present. No thyromegaly present. Cardiovascular: Normal rate and regular rhythm. Pulmonary/Chest: Breath sounds normal.   Abdominal: Bowel sounds are normal.   Musculoskeletal: He exhibits no edema. Neurological: He is alert and oriented to person, place, and time. Skin: Skin is warm and dry. Nursing note and vitals reviewed. PCP: Chantell Moya MD    Past Medical History:   Diagnosis Date    Blood in stool     Blood in stool     CAD (coronary artery disease)     residual RCA disease at the bifurcation which is treated medically; appears stable    Cardiac catheterization 11/22/2013    RCA patent and dominant; LM ok; LAD with ostial 40%; previous stent widely patent; Cx patent; LVEDP 10-12 mmHg; LVEF 40% with mild anterior hypokinesis; No sig MR or AS    Cardiac echocardiogram 10/15/2015    Tech difficult (Definity enhanced). Mild LVE. EF 35-40% (40-45% on study of 10/16/13). Mild diffuse hypk. Mod basal-mid anteroseptal, basal-mid inferior hypk. Severe apical hypk. Mild LVH. Indeterminate diastolic fx. Mild MR. Mild IVCE. Findings suggest CAD in LAD.     Cardiac nuclear imaging test, abnormal 10/16/2013    Lg anterior anteroapical infarction w/o cash-infarct ischemia involving distal LAD. EF not obtained due to gating error. Neg EKG on pharm stress test.  High risk due to amount of myocardium involved.  Cardiac tilt table evaluation 04/07/2005    Positive tilt-table study, suggesting vasodepressor-type syncope. Patient developed mild bradycardia after a loss of consciousness.  Diabetes ([facility] [address]     non-insulin dependent    Dyslipidemia     on statins    Hyperlipidemia     on Zocor    Hypertension     controlled; high normal    Hypokinesis     mild global w/ ejection fraction of 45-50%; acute AMI 3/05 w/ stent LAD; residual rt coronary artery disease. Post MI EF of 30-35%; Recent LV gram 12/05 showed EF of 45%    Kidney stone     Microscopic hematuria     Myocardial infarction ([facility] [address] 3/05    anterior wall; LAD stenting    Obesity     mild to moderate    Skin cancer     Urinary tract infection, site not specified        Past Surgical History:   Procedure Laterality Date    HX TONSIL AND ADENOIDECTOMY      LEFT HEART PERCUTANEOUS  11/22/2013         [facility] CATH PLMT L HRT & ARTS W/NJX & ANGIO IMG S&I  11/22/2013            Current Outpatient Medications   Medication Sig Dispense Refill    Cholecalciferol, Vitamin D3, (VITAMIN D3) 1,000 unit chew Take 1,000 Units by mouth daily. 30 Tab 0    furosemide (LASIX) 40 mg tablet Take 1 Tab by mouth two (2) times a day. 60 Tab 0    canagliflozin (INVOKANA) 100 mg tablet Take 100 mg by mouth Daily (before breakfast).  linagliptin (TRADJENTA) 5 mg tablet Take 5 mg by mouth daily.  metoprolol-XL (TOPROL-XL) 100 mg XL tablet TAKE 1 TABLET BY MOUTH EVERY DAY 30 Tab 11    glipiZIDE SR (GLUCOTROL XL) 10 mg CR tablet Take 10 mg by mouth two (2) times a day.  aspirin 81 mg tablet Take  by mouth.  simvastatin (ZOCOR) 20 mg tablet Take  by mouth nightly.  ezetimibe (ZETIA) 10 mg tablet Take  by mouth.       irbesartan (AVAPRO) 300 mg tablet Take 300 mg by mouth nightly.  MULTIVITAMINS W-MINERALS/LUT (CENTRUM SILVER PO) Take  by mouth.  doxazosin (CARDURA) 2 mg tablet Take 2 mg by mouth daily.  clopidogrel (PLAVIX) 75 mg tablet Take  by mouth daily.          The patient has a family history of    Social History     Tobacco Use    Smoking status: Never Smoker    Smokeless tobacco: Never Used   Substance Use Topics    Alcohol use: No    Drug use: Not on file       Lab Results   Component Value Date/Time    Cholesterol, total 98 10/16/2015 12:36 AM    HDL Cholesterol 37 (L) 10/16/2015 12:36 AM    LDL, calculated 41.8 10/16/2015 12:36 AM    Triglyceride 96 10/16/2015 12:36 AM    CHOL/HDL Ratio 2.6 10/16/2015 12:36 AM        BP Readings from Last 3 Encounters:   10/29/19 128/62   04/23/19 104/60   10/09/18 114/56        Pulse Readings from Last 3 Encounters:   10/29/19 89   04/23/19 100   10/09/18 (!) 110       Wt Readings from Last 3 Encounters:   10/29/19 137.9 kg (304 lb)   04/23/19 134.7 kg (297 lb)   10/09/18 136.1 kg (300 lb)         EKG: unchanged from previous tracings, normal sinus rhythm with first-degree heart block, low precordial R wave voltage with Q waves noted in V1 and V2.

## 2019-10-29 NOTE — PROGRESS NOTES
Darell Smith. presents today for   Chief Complaint   Patient presents with    Coronary Artery Disease     6 month follow up     Swelling     mild       Shahram Dove H Noriega Yanira Herring. preferred language for health care discussion is english/other. Is someone accompanying this pt? no    Is the patient using any DME equipment during [address]? Cane     Depression Screening:  3 most recent PHQ Screens 4/23/2019   Little interest or pleasure in doing things Not at all   Feeling down, depressed, irritable, or hopeless Not at all   Total Score PHQ 2 0       Learning Assessment:  Learning Assessment 5/30/2017   PRIMARY LEARNER Patient   CO-LEARNER CAREGIVER -   PRIMARY LANGUAGE ENGLISH   LEARNER PREFERENCE PRIMARY DEMONSTRATION   ANSWERED BY Patient   RELATIONSHIP SELF       Abuse Screening:  No flowsheet data found. Fall Risk  Fall Risk Assessment, last 12 mths 4/23/2019   Able to walk? Yes   Fall in past 12 months? No       Pt currently taking Anticoagulant therapy? ASA 81mg and Plavix every day     Coordination of Care:  1. Have you been to the ER, urgent care clinic since your last visit? Hospitalized since your last visit? no    2. Have you seen or consulted any other health care providers outside of the [address] [place] [address] since your last visit? Include any pap smears or colon screening.  no